# Patient Record
Sex: MALE | Race: WHITE | URBAN - METROPOLITAN AREA
[De-identification: names, ages, dates, MRNs, and addresses within clinical notes are randomized per-mention and may not be internally consistent; named-entity substitution may affect disease eponyms.]

---

## 2017-07-14 ENCOUNTER — APPOINTMENT (OUTPATIENT)
Dept: GENERAL RADIOLOGY | Facility: CLINIC | Age: 37
End: 2017-07-14
Attending: EMERGENCY MEDICINE

## 2017-07-14 ENCOUNTER — HOSPITAL ENCOUNTER (EMERGENCY)
Facility: CLINIC | Age: 37
Discharge: HOME | End: 2017-07-14
Attending: EMERGENCY MEDICINE | Admitting: EMERGENCY MEDICINE

## 2017-07-14 VITALS
HEART RATE: 90 BPM | DIASTOLIC BLOOD PRESSURE: 81 MMHG | TEMPERATURE: 97.7 F | SYSTOLIC BLOOD PRESSURE: 124 MMHG | OXYGEN SATURATION: 96 % | RESPIRATION RATE: 18 BRPM
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
## 2017-07-14 DIAGNOSIS — R07.89 CHEST WALL PAIN: ICD-10-CM

## 2017-07-14 DIAGNOSIS — S22.060A TRAUMATIC COMPRESSION FRACTURE OF T8 THORACIC VERTEBRA, CLOSED, INITIAL ENCOUNTER (H): ICD-10-CM

## 2017-07-14 PROCEDURE — 71020 XR CHEST 2 VW: CPT

## 2017-07-14 PROCEDURE — 25000132 ZZH RX MED GY IP 250 OP 250 PS 637: Performed by: EMERGENCY MEDICINE

## 2017-07-14 PROCEDURE — 76604 US EXAM CHEST: CPT

## 2017-07-14 PROCEDURE — 99284 EMERGENCY DEPT VISIT MOD MDM: CPT | Mod: 25

## 2017-07-14 RX ORDER — OXYCODONE HYDROCHLORIDE 5 MG/1
5 TABLET ORAL EVERY 6 HOURS PRN
Qty: 20 TABLET | Refills: 0 | Status: SHIPPED | OUTPATIENT
Start: 2017-07-14

## 2017-07-14 RX ORDER — OXYCODONE AND ACETAMINOPHEN 5; 325 MG/1; MG/1
1 TABLET ORAL ONCE
Status: COMPLETED | OUTPATIENT
Start: 2017-07-14 | End: 2017-07-14

## 2017-07-14 RX ADMIN — OXYCODONE HYDROCHLORIDE AND ACETAMINOPHEN 1 TABLET: 5; 325 TABLET ORAL at 09:34

## 2017-07-14 ASSESSMENT — ENCOUNTER SYMPTOMS
ABDOMINAL PAIN: 0
ARTHRALGIAS: 1
COLOR CHANGE: 1
HEMATURIA: 0

## 2017-07-14 NOTE — ED AVS SNAPSHOT
[facility] Emergency Department    [address]    [facility] [address]    Phone:  [phone]    Fax:  [phone]                                       Demetria Vickers   MRN: [id number]    Department:  [facility] Emergency Department   Date of Visit:  7/14/2017           Patient Information     Date Of Birth          1980        Your diagnoses for this visit were:     Fall, initial encounter     Chest wall pain     Traumatic compression fracture of T8 thoracic vertebra, closed, initial encounter (H)        You were seen by Emily Gonzalez MD and Remi Baldwin MD.      Follow-up Information     Follow up with [facility] Emergency Department.    Specialty:  EMERGENCY MEDICINE    Why:  If symptoms worsen    Contact information:    [address]  [facility] [id number] [phone]        Discharge Instructions       Follow-up:  Please follow-up with your primary care provider in 2-3 days for re-evaluation and discussion of your visit to the emergency department today.    Home treatments:  Recommended home therapies include rest, fluids, tylenol, oxycodone for pain, incentive spirometry.    New prescriptions:  Oxycodone (no driving or operating heavy machinery)    Return precautions:  Warning signs which should prompt you to return to the ER include worsening pain, coughing up blood, hematuria, or any other new or troubling symptoms.  We are always happy to see you again.      Rib Fracture    You broke one or more ribs. This is called a rib fracture. Rib fractures do not require a cast like other bones. They will heal by themselves in about 4-6 weeks. The first 3-4 weeks will be the most painful because deep breathing, coughing, or changing position from sitting to lying down, may cause the broken ends to move slightly.  Home care    Rest. You should not be doing any heavy lifting or strenuous exertion until the pain goes  away.    It hurts to breathe when you have a broken rib. This puts you at risk of getting pneumonia from poor airflow through your lungs. To prevent this:    Take several very deep breaths once an hour while you're awake. Exhale through pursed lips as if you are blowing up a balloon. If possible, actually blow up a balloon or a rubber glove. This exercise builds up pressure inside the lung and prevents collapse of the small air sacs of the lung. This exercise may cause some pain at the site of injury, which is normal.    You may have gotten a breathing exercise device called an incentive spirometer. Use it at least 4 times a day, or as directed.    Apply an ice pack over the injured area for 15 to 20 minutes every 1 to 2 hours. You should do this for the first 24 to 48 hours. You can make an ice pack by filling a plastic bag that seals at the top with ice cubes and then wrapping it with a thin towel. Continue with ice packs as needed for the relief of pain and swelling.    You may use over-the-counter pain medicine to control pain, unless another pain medicine was prescribed. If you have chronic liver or kidney disease or ever had a stomach ulcer or GI bleeding, talk with your healthcare provider before using these medicines.    If your pain is not controlled, contact your healthcare provider. Sometimes a stronger pain medicine may be needed. A nerve block can be done in case of severe pain. It will numb the nerve between the ribs.  Follow-up care  Follow up with your healthcare provider, or as advised. Rarely, a broken rib will cause complications within the first few days that may not be evident during your initial exam. This can include collapsed lung, bleeding around the lung or into the abdomen, or pneumonia. Therefore, watch for the signs below.  If X-rays were taken, you will be told of any new findings that may affect your care.  Call 911  Call 911 if you have:    Dizziness, weakness or fainting    Shortness  of breath with or without chest discomfort    New or worsening abdominal pain    Discomfort in other areas of your upper body such as your shoulders, jaw, neck, or arms  When to seek medical advice  Call your healthcare provider right away if any of these occur:    Increasing chest pain with breathing    Fever of 100.4 F (38 C) or above lasting for 24 to 48 hours    Congested cough  Date Last Reviewed: 12/3/2015    3311-0346 [employer]. [address] [place] [address], [place] [address]. All rights reserved. This information is not intended as a substitute for professional medical care. Always follow your healthcare professional's instructions.              24 Hour Appointment Hotline       To make an appointment at any [facility], call [phone] ([phone]). If you don't have a family doctor or clinic, we will help you find one. [facility] are conveniently located to serve the needs of you and your family.             Review of your medicines      START taking        Dose / Directions Last dose taken    oxyCODONE 5 MG IR tablet   Commonly known as:  ROXICODONE   Dose:  5 mg   Quantity:  20 tablet        Take 1 tablet (5 mg) by mouth every 6 hours as needed for pain   Refills:  0                Prescriptions were sent or printed at these locations (1 Prescription)                   Other Prescriptions                Printed at Department/Unit printer (1 of 1)         oxyCODONE (ROXICODONE) 5 MG IR tablet                Procedures and tests performed during your visit     Chest XR,  PA & LAT    POC US ABDOMEN LIMITED      Orders Needing Specimen Collection     None      Pending Results     No orders found from 7/12/2017 to 7/15/2017.            Pending Culture Results     No orders found from 7/12/2017 to 7/15/2017.            Pending Results Instructions     If you had any lab results that were not finalized at the time of your Discharge, you can call the ED Lab Result RN at [phone].  You will be contacted by this team for any positive Lab results or changes in treatment. The nurses are available 7 days a week from 10A to 6:30P.  You can leave a message 24 hours per day and they will return your call.        Test Results From Your Hospital Stay        7/14/2017  9:38 AM      Impression      FAST (Focused Assessment with Sonography for Trauma) Procedure Note:    PROCEDURE: PERFORMED BY: Dr. Remi Baldwin  INDICATIONS/SYMPTOM:  Chest Wall Pain  PROBE: Low frequency convex probe  BODY LOCATION: The ultrasound was performed in the abdominal areas.  FINDINGS: No evidence of free fluid in hepatorenal (Morison s pouch), perisplenic, or and pelvic areas.      INTERPRETATION: The FAST exam was normal. There was no free fluid present.  IMAGE DOCUMENTATION: Images were archived to PACs system.           7/14/2017 10:41 AM      Narrative     XR CHEST 2 VW 7/14/2017 10:38 AM    HISTORY: fall, left rib cage pain        Impression     IMPRESSION: Minimal wedging of T8 vertebral body, age unknown.  Clinical correlation recommended. Mild thoracolumbar scoliosis. No  other findings.    LORETTA DE PAZ MD                Clinical Quality Measure: Blood Pressure Screening     Your blood pressure was checked while you were in the emergency department today. The last reading we obtained was  BP: (!) 153/91 . Please read the guidelines below about what these numbers mean and what you should do about them.  If your systolic blood pressure (the top number) is less than 120 and your diastolic blood pressure (the bottom number) is less than 80, then your blood pressure is normal. There is nothing more that you need to do about it.  If your systolic blood pressure (the top number) is 120-139 or your diastolic blood pressure (the bottom number) is 80-89, your blood pressure may be higher than it should be. You should have your blood pressure rechecked within a year by a primary care provider.  If your systolic blood  "pressure (the top number) is 140 or greater or your diastolic blood pressure (the bottom number) is 90 or greater, you may have high blood pressure. High blood pressure is treatable, but if left untreated over time it can put you at risk for heart attack, stroke, or kidney failure. You should have your blood pressure rechecked by a primary care provider within the next 4 weeks.  If your provider in the emergency department today gave you specific instructions to follow-up with your doctor or provider even sooner than that, you should follow that instruction and not wait for up to 4 weeks for your follow-up visit.        Thank you for choosing [facility]       Thank you for choosing [facility] for your care. Our goal is always to provide you with excellent care. Hearing back from our patients is one way we can continue to improve our services. Please take a few minutes to complete the written survey that you may receive in the mail after you visit with us. Thank you!        [employer] Information     [employer] lets you send messages to your doctor, view your test results, renew your prescriptions, schedule appointments and more. To sign up, go to [email or web address] . Click on \"Log in\" on the left side of the screen, which will take you to the Welcome page. Then click on \"Sign up Now\" on the right side of the page.     You will be asked to enter the access code listed below, as well as some personal information. Please follow the directions to create your username and password.     Your access code is: [email or web address]  Expires: 10/12/2017 10:49 AM     Your access code will  in 90 days. If you need help or a new code, please call your [facility] clinic or [phone].        Care EveryWhere ID     This is your Care EveryWhere ID. This could be used by other organizations to access your [facility] medical records  [id number]        Equal Access to Services     MATTHIAS YU AH: mary Gabriel, " lary crow ah. So [facility] [phone].    ATENCIÓN: Si habla saraañol, tiene a thornton disposición servicios gratuitos de asistencia lingüística. Llame al [phone].    We comply with applicable federal civil rights laws and [place] laws. We do not discriminate on the basis of race, color, national origin, age, disability sex, sexual orientation or gender identity.            After Visit Summary       This is your record. Keep this with you and show to your community pharmacist(s) and doctor(s) at your next visit.

## 2017-07-14 NOTE — ED AVS SNAPSHOT
[facility] Emergency Department    [address]    [facility] [address]    Phone:  [phone]    Fax:  [phone]                                       Demetria Vickers   MRN: [id number]    Department:  [facility] Emergency Department   Date of Visit:  7/14/2017           After Visit Summary Signature Page     I have received my discharge instructions, and my questions have been answered. I have discussed any challenges I see with this plan with the nurse or doctor.    ..........................................................................................................................................  Patient/Patient Representative Signature      ..........................................................................................................................................  Patient Representative Print Name and Relationship to Patient    ..................................................               ................................................  Date                                            Time    ..........................................................................................................................................  Reviewed by Signature/Title    ...................................................              ..............................................  Date                                                            Time

## 2017-07-14 NOTE — ED NOTES
Pt arrives to ED with complaints of left sided rib pain following a fall yesterday afternoon where he fell sideways on a table. Pt ambulatory. Pt complains of worsening pain with a deep breath and feeling short of breath with activities. Pt states he hasn't taken any ibuprofen or anything because of an ulcer he had a couple years ago. ABCs intact. A&Ox3.

## 2017-07-14 NOTE — ED PROVIDER NOTES
History     Chief Complaint:  Fall      The history is provided by the patient.      Demetria Vickers is a 37 year old male who presents with fall. 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 He tried catching himself on a desk when he fell but the desk flipped and he landed on his left ribs on the edge of the flipped desk. He tried walking the pain off but his pain persisted. He took Tylenol at 2300 last night before bed and reports waking up about 4 times throughout the night due to his pain. His pain was still present when he woke up this morning and prevented him from working, prompting his visit to the emergency department. He currently rates his pain 7/10 in severity and reports some red marks where he landed on his left side. He denies any abdominal pain, gross hematuria, back pain or other medical concerns.    Allergies:  No known drug allergies      Medications:    The patient is not currently taking any prescribed medications.     Past Medical History:    Stomach ulcers    Past Surgical History:    The patient does not have any past pertinent medical history.     Family History:    History reviewed. No pertinent family history.        Social History:  Presents with spouse   Tobacco use: Not on file  Alcohol use: Not on file  PCP: No primary care provider on file.    Marital Status:       Review of Systems   Gastrointestinal: Negative for abdominal pain.   Genitourinary: Negative for hematuria.   Musculoskeletal: Positive for arthralgias.   Skin: Positive for color change.   All other systems reviewed and are negative.    Physical Exam     Patient Vitals for the past 24 hrs:   BP Temp Temp src Pulse Heart Rate Resp SpO2   07/14/17 1043 - - - - - - 96 %   07/14/17 1030 (!) 153/91 - - - - - -   07/14/17 1013 - - - - - - 98 %   07/14/17 0945 - - - - - - 99 %   07/14/17 0930 (!) 174/104 - - - - - -   07/14/17 0920 (!) 174/103 97.7  F (36.5  C)  Oral 110 110 20 98 %      Physical Exam  General:                        Well-nourished                        Speaking in full sentences  Eyes:                        Conjunctiva without injection or scleral icterus                        PERRL  ENT:                        Moist mucous membranes                        Posterior oropharynx clear without erythema or exudate                        Nares patent                        Pinnae normal  Neck:                        Full ROM                        No stiffness appreciated  Resp:                        Lungs CTAB                        No crackles, wheezing or audible rubs                        Good air movement                        Tenderness to palpation along left costal margin                        No flail segment                        No bruising                        No crepitance  CV:                                        Normal rate, regular rhythm                        S1 and S2 present                        No murmur, gallop or rub  GI:                        BS present                        Abdomen soft without distention                        Non-tender to light and deep palpation                        No focal RUQ or LUQ pain                        No guarding or rebound tenderness  Skin:                        Warm, dry, well perfused                        No rashes or open wounds on exposed skin  MSK:                        Moves all extremities                        No focal deformities or swelling             No midline cervical, thoracic, or lumbar spine tenderness  Neuro:                        Alert                        Answers questions appropriately                        Moves all extremities equally                        Gait stable  Psych:                        Normal affect, normal mood    Emergency Department Course   Imaging:  Radiographic findings were communicated with the patient who voiced understanding of  the findings.  Chest XR, PA & LAT  IMPRESSION: Minimal wedging of T8 vertebral body, age unknown.  Clinical correlation recommended. Mild thoracolumbar scoliosis. No  other findings.    LORETTA DE PAZ MD    Imaging independently reviewed and agree with radiologist interpretation.          Procedures:  Results for orders placed during the hospital encounter of 07/14/17   POC US ABDOMEN LIMITED    Impression  FAST (Focused Assessment with Sonography for Trauma) Procedure Note:    PROCEDURE: PERFORMED BY: Dr. Remi Baldwin  INDICATIONS/SYMPTOM:  Chest Wall Pain  PROBE: Low frequency convex probe  BODY LOCATION: The ultrasound was performed in the abdominal areas.  FINDINGS: No evidence of free fluid in hepatorenal (Morison s pouch), perisplenic, or and pelvic areas.      INTERPRETATION: The FAST exam was normal. There was no free fluid present.  IMAGE DOCUMENTATION: Images were archived to PACs system.           Interventions:  0934: Percocet 5-325 mg 1 tablet PO    Emergency Department Course:  Past medical records, nursing notes, and vitals reviewed.  0935: I performed an exam of the patient and obtained history, as documented above.    1048: I rechecked the patient. Findings and plan explained to the Patient. Patient discharged home with instructions regarding supportive care, medications, and reasons to return. The importance of close follow-up was reviewed.        Impression & Plan    Medical Decision Making:  Demetria Vickers is a 37 year old male presenting to the ED for evaluation of a fall. Examination notable for tenderness to palpation about the left saumya-thorax without palpable crepitance, step-off, or overlying skin changes. Symptoms are most consistent with rib fracture. XR demonstrates no findings of pneumothorax or hemothorax. Note is made of T8 deformity, though this is likely chronic (pt repots prior back injury yrs prior, and has no midline tenderness).  I considered intraabdominal solid organ  injury though I feel this to be unlikely. He exhibits no focal abdominal tenderness, denies gross hematuria, and bedside ultrasound is negative for free intraabdominal fluid or gross abnormality to the spleen. Given my low pretest probability for solid organ injury, I do feel risks of CT imaging outweigh benefits at this time. Symptoms were improved with the above analgesic. Clinical impression was discussed with the patient. I recommended symptomatic care such as rest, Tylenol, oxycodone for breakthrough pain (opiate precautions discussed), and his symptoms spirometry. Recommended follow-up with PCP in two to three days. Return to ER with severe pain, cough, difficulty breathing, fevers, or any other concerns. Questions answered prior to discharge.    Diagnosis:    ICD-10-CM    1. Fall, initial encounter W19.XXXA    2. Chest wall pain R07.89    3. Traumatic compression fracture of T8 thoracic vertebra, closed, initial encounter (H) S22.060A        Disposition:  Discharged to home with plan as outlined above.    Discharge Medications:  New Prescriptions    OXYCODONE (ROXICODONE) 5 MG IR TABLET    Take 1 tablet (5 mg) by mouth every 6 hours as needed for pain         Miquel Wright  7/14/2017   [facility] EMERGENCY DEPARTMENT  I, Miquel Wright, am serving as a scribe at 9:24 AM on 7/14/2017 to document services personally performed by Remi Baldwin MD based on my observations and the provider's statements to me.       Remi Baldwin MD  07/14/17 0003

## 2017-07-14 NOTE — DISCHARGE INSTRUCTIONS
Follow-up:  Please follow-up with your primary care provider in 2-3 days for re-evaluation and discussion of your visit to the emergency department today.    Home treatments:  Recommended home therapies include rest, fluids, tylenol, oxycodone for pain, incentive spirometry.    New prescriptions:  Oxycodone (no driving or operating heavy machinery)    Return precautions:  Warning signs which should prompt you to return to the ER include worsening pain, coughing up blood, hematuria, or any other new or troubling symptoms.  We are always happy to see you again.      Rib Fracture    You broke one or more ribs. This is called a rib fracture. Rib fractures do not require a cast like other bones. They will heal by themselves in about 4-6 weeks. The first 3-4 weeks will be the most painful because deep breathing, coughing, or changing position from sitting to lying down, may cause the broken ends to move slightly.  Home care    Rest. You should not be doing any heavy lifting or strenuous exertion until the pain goes away.    It hurts to breathe when you have a broken rib. This puts you at risk of getting pneumonia from poor airflow through your lungs. To prevent this:    Take several very deep breaths once an hour while you're awake. Exhale through pursed lips as if you are blowing up a balloon. If possible, actually blow up a balloon or a rubber glove. This exercise builds up pressure inside the lung and prevents collapse of the small air sacs of the lung. This exercise may cause some pain at the site of injury, which is normal.    You may have gotten a breathing exercise device called an incentive spirometer. Use it at least 4 times a day, or as directed.    Apply an ice pack over the injured area for 15 to 20 minutes every 1 to 2 hours. You should do this for the first 24 to 48 hours. You can make an ice pack by filling a plastic bag that seals at the top with ice cubes and then wrapping it with a thin towel. Continue  with ice packs as needed for the relief of pain and swelling.    You may use over-the-counter pain medicine to control pain, unless another pain medicine was prescribed. If you have chronic liver or kidney disease or ever had a stomach ulcer or GI bleeding, talk with your healthcare provider before using these medicines.    If your pain is not controlled, contact your healthcare provider. Sometimes a stronger pain medicine may be needed. A nerve block can be done in case of severe pain. It will numb the nerve between the ribs.  Follow-up care  Follow up with your healthcare provider, or as advised. Rarely, a broken rib will cause complications within the first few days that may not be evident during your initial exam. This can include collapsed lung, bleeding around the lung or into the abdomen, or pneumonia. Therefore, watch for the signs below.  If X-rays were taken, you will be told of any new findings that may affect your care.  Call 911  Call 911 if you have:    Dizziness, weakness or fainting    Shortness of breath with or without chest discomfort    New or worsening abdominal pain    Discomfort in other areas of your upper body such as your shoulders, jaw, neck, or arms  When to seek medical advice  Call your healthcare provider right away if any of these occur:    Increasing chest pain with breathing    Fever of 100.4 F (38 C) or above lasting for 24 to 48 hours    Congested cough  Date Last Reviewed: 12/3/2015    [id number] [employer]. [address] [place] [address], [place] [address]. All rights reserved. This information is not intended as a substitute for professional medical care. Always follow your healthcare professional's instructions.